# Patient Record
Sex: MALE | URBAN - METROPOLITAN AREA
[De-identification: names, ages, dates, MRNs, and addresses within clinical notes are randomized per-mention and may not be internally consistent; named-entity substitution may affect disease eponyms.]

---

## 2024-08-16 ENCOUNTER — DOCUMENTATION (OUTPATIENT)
Dept: TRANSPLANT | Facility: HOSPITAL | Age: 69
End: 2024-08-16
Payer: MEDICARE

## 2024-08-19 ENCOUNTER — DOCUMENTATION (OUTPATIENT)
Dept: TRANSPLANT | Facility: HOSPITAL | Age: 69
End: 2024-08-19
Payer: MEDICARE

## 2024-08-19 ENCOUNTER — TELEPHONE (OUTPATIENT)
Dept: RESPIRATORY THERAPY | Facility: HOSPITAL | Age: 69
End: 2024-08-19

## 2024-08-19 NOTE — Clinical Note
[facility] auth# [id number] valid 08/16/24-02/15/25.  Listing approval will be needed.

## 2024-08-19 NOTE — PROGRESS NOTES
[facility] auth# [id number] valid 08/16/24-02/15/25.  Listing approval will be needed.

## 2024-08-21 ENCOUNTER — TELEPHONE (OUTPATIENT)
Dept: TRANSPLANT | Facility: HOSPITAL | Age: 69
End: 2024-08-21

## 2024-08-21 NOTE — TELEPHONE ENCOUNTER
Lung Intake  Intake Facilitator: Debbie Juarez   Have you ever contacted the [facility] [place] before? No   Are you currently listed or being evaluated by any other transplant center? No   If yes, where are you currently listed or being evaluated for transplant? N/A  Are you also seeking other organ transplant(s)? No   Do you have your own transportation? Yes   Do you have a 's license? Yes   Do you have a working vehicle? Yes   Do you have someone in your support system that can drive you to and from all of your appointments as needed? Yes, DANYELLE KAMARA  Are you prepared to drive to [facility] in [place], [place] for all necessary appointments, if no, you may need to speak with a  prior to scheduling your appointments. We are unable to assist with transportation or parking. Yes   Who do you anticipate will be your main support person? Yes, DANYELLE KAMARA    Pulmonary Care  Have you been admitted to the hospital in the past twelve months? Yes   If yes, when and where were you admitted? 05/2024, [facility]  Are you currently prescribed oxygen? Yes   How much oxygen do you require at rest? 4 LITERS  How much oxygen with activity? 4 LITERS  Do you wear CPAP or BIPAP? No, HAD BIPAP   Are you currently participating in pulmonary rehab? Yes   If yes, where and how frequently do you participate? [facility], 3X A WEEK MON, WED, THURS  Have you had Pulmonary function testing (PFT) in the last six months? Yes   If yes, when and where? [facility], 07/2024  Have you had a CT scan of your chest in the last six months? Yes   If yes, when and where? [facility], 05/2024  Have you had a cardiac catheterization in the last six months? No   If yes, when and where? N/A  Have you had an echocardiogram in the last 6 months? No   If yes, when and where? N/A        General Health Status Lung  Any food or drug allergies? No   How tall are you?  6'0  What is your usual weight? 180  Have you lost  "weight in the past 3 months? Yes   If yes, how much weight have you lost? 15 LBS  If yes, was this weight loss intentional? Yes   Has your food intake or appetite decreased over the past week? No   Do you have any chronic infections or blood infections such as HIV, Hepatitis C, Hepatitis B? No   Have you had a shingles vaccine? No   Have you had an influenza vaccine in the last year? No   Have you had a pneumonia vaccine in the last 5 years? Yes, 11/2022  Have you had COVID? Yes   Have you received the COVID vaccine? Yes   Have you received any vaccines in the last 3 months? No   Have you had previous surgery in the chest area (heart and/or lungs)? Yes   When, where and what chest surgeries have you had? OPEN HEART SURGERY 04/2021.  Are you a Temple? No   Do you have any Jehovah's witness beliefs that prevent you from receiving blood products? No   Have you ever had blood transfusions? No   How many blood transfusions have you had?  N/A  When was your last blood transfusion? N/A  Have you ever been pregnant? No   How many pregnancies have you had (including any that did not come to term)  When was your most recent pregnancy? N/A  Do you use any assistive devices? (Walker, wheelchair, glasses, hearing aids, etc.) Yes   If yes, what assistive devices are used? READING GLASSES  Any difficulty reading or writing? No   Do you have any other medical conditions besides your lung disease? Yes, \"LITTLE HEART PROBLEM\"  Cancer History Lung  Do you have a history of cancer? No   Cancer details N/A  Oncologist Name and when was your last visit N/A  Substance Use History Lung  Are you a current or former tobacco user? Yes, FORMER  When did you start using tobacco products? (year) 1970  Describe your tobacco use. (packs/day, cigarettes/day) 2 PACKS A DAY  When did you quit using tobacco? 03/2022  Do you use other forms of nicotine such as patches, gum, vapes, or e-cigs? No   Describe your nicotine use including amount and " frequency? N/A  When did you quit using nicotine? N/A  Are you a current or former alcohol user? Yes, SOCIALLY    How many drinks per week? 2 PER YEAR  When did you quit drinking alcohol? N/A  Have you ever received treatment for alcohol abuse? NO  If so, where and when? N/A  Have you been prescribed medical marijuana? No   What is your prescription and who prescribes it? N/A  How do you consume your marijuana? N/A  Are you a current or former user of non-prescribed drugs such as marijuana, heroin, cocaine, meth, crack, opioids, or any other recreational substances? No   Describe your drug use. N/A  When did you quit using drugs? N/A  Have you ever received treatment for substance abuse? No   If so, where and when? N/A  General Health Maintenance  Have you had a colonoscopy in the last 5 years? No   If yes, when and where? N/A  If no, do you have a family history of colon cancer? No   Have you had a PAP test in the past 2 years? No   If you had a hysterectomy, is a PAP test no longer needed? No   When and where was your last PAP test performed? N/A  PAP test results, other comments N/A  Do you have a family history of breast cancer? Yes   When and where was your last mammogram? N/A  Have you visited a dentist within the last 6 months? No   Do you have full or partial dentures? Yes, FULL   When and where was your last dental visit? 20 YEARS AGO, IN [place]  What is your availability for appointments? We will try to accommodate when possible. OPEN AVAILABILITY  If still working, can you get time off? NOT WORKING  Do you consent to bringing a support person with you to Education and Evaluation appointments? Yes   Are you able to bring a medication list as well as an immunizations list to your education appointment? Yes    Comments: VERY KIND.

## 2024-08-28 ENCOUNTER — APPOINTMENT (OUTPATIENT)
Dept: TRANSPLANT | Facility: HOSPITAL | Age: 69
End: 2024-08-28
Payer: MEDICARE

## 2024-09-03 ENCOUNTER — SOCIAL WORK (OUTPATIENT)
Dept: TRANSPLANT | Facility: HOSPITAL | Age: 69
End: 2024-09-03
Payer: MEDICARE

## 2024-09-03 ENCOUNTER — OFFICE VISIT (OUTPATIENT)
Dept: TRANSPLANT | Facility: HOSPITAL | Age: 69
End: 2024-09-03
Payer: MEDICARE

## 2024-09-03 ENCOUNTER — NURSE ONLY (OUTPATIENT)
Dept: TRANSPLANT | Facility: HOSPITAL | Age: 69
End: 2024-09-03
Payer: MEDICARE

## 2024-09-03 VITALS
HEART RATE: 92 BPM | DIASTOLIC BLOOD PRESSURE: 88 MMHG | HEIGHT: 71 IN | OXYGEN SATURATION: 90 % | BODY MASS INDEX: 25.76 KG/M2 | TEMPERATURE: 97.5 F | SYSTOLIC BLOOD PRESSURE: 145 MMHG | WEIGHT: 184 LBS

## 2024-09-03 DIAGNOSIS — Z95.1 S/P CABG X 3: ICD-10-CM

## 2024-09-03 DIAGNOSIS — Z01.818 ENCOUNTER FOR PRE-TRANSPLANT EVALUATION FOR LUNG TRANSPLANT: Primary | ICD-10-CM

## 2024-09-03 DIAGNOSIS — F41.9 ANXIETY: ICD-10-CM

## 2024-09-03 DIAGNOSIS — J43.9 PULMONARY EMPHYSEMA, UNSPECIFIED EMPHYSEMA TYPE (MULTI): ICD-10-CM

## 2024-09-03 DIAGNOSIS — I50.9 CONGESTIVE HEART FAILURE, UNSPECIFIED HF CHRONICITY, UNSPECIFIED HEART FAILURE TYPE (MULTI): ICD-10-CM

## 2024-09-03 DIAGNOSIS — I27.20 PULMONARY HYPERTENSION (MULTI): ICD-10-CM

## 2024-09-03 PROBLEM — D72.10 EOSINOPHILIA: Status: ACTIVE | Noted: 2024-09-03

## 2024-09-03 PROBLEM — I48.0 PAROXYSMAL ATRIAL FIBRILLATION (MULTI): Status: ACTIVE | Noted: 2024-09-03

## 2024-09-03 PROBLEM — J42 CHRONIC BRONCHITIS (MULTI): Status: ACTIVE | Noted: 2024-09-03

## 2024-09-03 PROCEDURE — 99215 OFFICE O/P EST HI 40 MIN: CPT | Performed by: INTERNAL MEDICINE

## 2024-09-03 PROCEDURE — 3008F BODY MASS INDEX DOCD: CPT | Performed by: INTERNAL MEDICINE

## 2024-09-03 PROCEDURE — 1126F AMNT PAIN NOTED NONE PRSNT: CPT | Performed by: INTERNAL MEDICINE

## 2024-09-03 PROCEDURE — 1159F MED LIST DOCD IN RCRD: CPT | Performed by: INTERNAL MEDICINE

## 2024-09-03 PROCEDURE — 1160F RVW MEDS BY RX/DR IN RCRD: CPT | Performed by: INTERNAL MEDICINE

## 2024-09-03 PROCEDURE — 99205 OFFICE O/P NEW HI 60 MIN: CPT | Performed by: INTERNAL MEDICINE

## 2024-09-03 RX ORDER — ASPIRIN 81 MG/1
81 TABLET ORAL DAILY
COMMUNITY

## 2024-09-03 RX ORDER — TIOTROPIUM BROMIDE AND OLODATEROL 3.124; 2.736 UG/1; UG/1
2 SPRAY, METERED RESPIRATORY (INHALATION) DAILY
COMMUNITY

## 2024-09-03 RX ORDER — TRIAMCINOLONE ACETONIDE 55 UG/1
2 SPRAY, METERED NASAL NIGHTLY PRN
COMMUNITY

## 2024-09-03 RX ORDER — OXYCODONE HYDROCHLORIDE 5 MG/1
5 TABLET ORAL EVERY 4 HOURS PRN
COMMUNITY

## 2024-09-03 RX ORDER — AMOXICILLIN 250 MG
1 CAPSULE ORAL DAILY PRN
COMMUNITY

## 2024-09-03 RX ORDER — ATORVASTATIN CALCIUM 10 MG/1
10 TABLET, FILM COATED ORAL DAILY
COMMUNITY

## 2024-09-03 RX ORDER — GUAIFENESIN 600 MG/1
400 TABLET, EXTENDED RELEASE ORAL DAILY
COMMUNITY

## 2024-09-03 RX ORDER — PREDNISONE 10 MG/1
30 TABLET ORAL DAILY
COMMUNITY

## 2024-09-03 RX ORDER — FUROSEMIDE 20 MG/1
20 TABLET ORAL DAILY
COMMUNITY

## 2024-09-03 RX ORDER — MIRTAZAPINE 30 MG/1
30 TABLET, FILM COATED ORAL NIGHTLY
COMMUNITY

## 2024-09-03 RX ORDER — IPRATROPIUM BROMIDE AND ALBUTEROL SULFATE 2.5; .5 MG/3ML; MG/3ML
3 SOLUTION RESPIRATORY (INHALATION) 3 TIMES DAILY
COMMUNITY

## 2024-09-03 RX ORDER — ALBUTEROL SULFATE 90 UG/1
2 INHALANT RESPIRATORY (INHALATION) EVERY 6 HOURS PRN
COMMUNITY

## 2024-09-03 RX ORDER — CETIRIZINE HYDROCHLORIDE 5 MG/1
10 TABLET ORAL DAILY
COMMUNITY

## 2024-09-03 ASSESSMENT — ENCOUNTER SYMPTOMS
SLEEP DISTURBANCE: 1
HEMATOLOGIC/LYMPHATIC NEGATIVE: 1
GASTROINTESTINAL NEGATIVE: 1
ENDOCRINE NEGATIVE: 1
NUMBNESS: 1
ACTIVITY CHANGE: 1
NERVOUS/ANXIOUS: 1
SHORTNESS OF BREATH: 1
MUSCULOSKELETAL NEGATIVE: 1
FATIGUE: 1
LIGHT-HEADEDNESS: 1
COUGH: 1
DIZZINESS: 1

## 2024-09-03 ASSESSMENT — ANXIETY QUESTIONNAIRES
5. BEING SO RESTLESS THAT IT IS HARD TO SIT STILL: NOT AT ALL
3. WORRYING TOO MUCH ABOUT DIFFERENT THINGS: SEVERAL DAYS
GAD7 TOTAL SCORE: 4
7. FEELING AFRAID AS IF SOMETHING AWFUL MIGHT HAPPEN: NOT AT ALL
4. TROUBLE RELAXING: NOT AT ALL
6. BECOMING EASILY ANNOYED OR IRRITABLE: SEVERAL DAYS
2. NOT BEING ABLE TO STOP OR CONTROL WORRYING: SEVERAL DAYS
1. FEELING NERVOUS, ANXIOUS, OR ON EDGE: SEVERAL DAYS

## 2024-09-03 ASSESSMENT — PATIENT HEALTH QUESTIONNAIRE - PHQ9
8. MOVING OR SPEAKING SO SLOWLY THAT OTHER PEOPLE COULD HAVE NOTICED. OR THE OPPOSITE, BEING SO FIGETY OR RESTLESS THAT YOU HAVE BEEN MOVING AROUND A LOT MORE THAN USUAL: NOT AT ALL
5. POOR APPETITE OR OVEREATING: NOT AT ALL
6. FEELING BAD ABOUT YOURSELF - OR THAT YOU ARE A FAILURE OR HAVE LET YOURSELF OR YOUR FAMILY DOWN: NOT AT ALL
4. FEELING TIRED OR HAVING LITTLE ENERGY: NOT AT ALL
SUM OF ALL RESPONSES TO PHQ QUESTIONS 1-9: 1
SUM OF ALL RESPONSES TO PHQ9 QUESTIONS 1 & 2: 0
1. LITTLE INTEREST OR PLEASURE IN DOING THINGS: NOT AT ALL
2. FEELING DOWN, DEPRESSED OR HOPELESS: NOT AT ALL
7. TROUBLE CONCENTRATING ON THINGS, SUCH AS READING THE NEWSPAPER OR WATCHING TELEVISION: NOT AT ALL
3. TROUBLE FALLING OR STAYING ASLEEP OR SLEEPING TOO MUCH: SEVERAL DAYS
9. THOUGHTS THAT YOU WOULD BE BETTER OFF DEAD, OR OF HURTING YOURSELF: NOT AT ALL

## 2024-09-03 ASSESSMENT — PAIN SCALES - GENERAL: PAINLEVEL: 0-NO PAIN

## 2024-09-03 NOTE — PROGRESS NOTES
PRE-TRANSPLANT EDUCATION    Patient received education regarding the following topics as part of their pre-transplant evaluation:  The evaluation process, including:        o   Transplant team members and roles         o   Required consultations and testing        o   Selection criteria and suitability for transplant        o   Listing process and receiving an organ offer        o   Psychosocial and financial considerations for a successful transplant        o   Patient responsibilities, including the necessity of adhering to a strict medical regimen  An overview of the surgical procedure   Potential medical, surgical, and psychosocial risks to transplantation, including:        o   Wound infection        o   Pneumonia        o   Blood clot formation        o   Organ rejection, failure, and possibility of re-transplantation        o   Lifetime immunosuppression therapy and associated risks        o   Multi-organ system failure        o   Death        o   Depression        o   Post-Traumatic Stress Disorder        o   Generalized anxiety, issues of dependence, and feelings of guilt  Available alternatives to transplantation  National and [facility] [place] outcomes for one-year patient and graft-survival from the most recent SRTR program-specific report.     Donor risk factors that could affect the success of the transplant and the health of the patient, including:        o   Donor age        o   Donor medical and social history        o   Condition of the organ        o   Risk of inocencia cancer, HIV, Hepatitis B, Hepatitis C, or malaria if the infection is not detectable at the time of donation  Patient's right to withdraw consent for transplantation at any time during the process  Transplants not performed in a Medicare-approved transplant center could affect the patient's ability to have immunosuppression medication paid for under Medicare part B.     Patient was given the opportunity to have questions  answered.    Signed evaluation informed consent received? Yes.     Patient accompanied by Eunice godwin. Patient's goal with transplant is to be able to walk more freely and spend time with his horses. 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 Both participants very engaged and enthusiastic about the transplant evaluation process. Asked appropriate questions. He reports that he is going to pulmonary rehab 3 times/week. Reviewed medications with patient. He is taking oxycodone PRN per Palliative care for anxiety r/t SOB. Discussed that this would likely need to be stopped in order to continue on with the evaluation process. Patient also wondering when would be a good time to travel to visit his sons in [place]. For oxygen, he is wearing 4L NC with activity/rest/sleep. He states that he is in the process of obtaining a CPAP. Discussed patient with Amy BARROS and INGA White.

## 2024-09-03 NOTE — PROGRESS NOTES
Pre-transplant Evaluation    Jorge Alberto Martin is a yo M former smoker (78.6 pack/year) quit on 05/2023 who presents for initial lung transplant evaluation as a complication of Pulmonary HTN, he follows up with Dr. 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 Hx of PE (12/2022), Hx of HF with Recovered EF (35% 2022-> 65% 2024), PAF on Eliquis and Amiodarone previously, chronic peripheral eosinophilia and Anxiety.    He did encounter an episode of pneumonia and underwent Bronchoscopy 05/2024 which was positive for C albicans and M gordonae after that he was evaluated by [facility], PFT was obtained showing decreased in DLCO in the last year down to 23%. FEV1 47 to 50% remains stable. 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 He is currently on Stiolto inhaler, Duoneb Nebulizer and Prednisone 30 mg daily (which was initially prescribed as a Taper not clear why he is still using the full dose),, he is being followed by Palliative Medicaine and receiving Oxycodone and Mirtazapine for Anxiety and Insomnia. He is also  participating in pulmonary Rehab three times per week ( Mon, Wed, Fri).     Past Medical History:  Past Medical History:   Diagnosis Date    CAD (coronary artery disease)     Chylothorax     COPD (chronic obstructive pulmonary disease) (Multi)     Hx of atrial flutter     Mitral regurgitation     Pneumonia         Past Surgical history:  Past Surgical History:   Procedure Laterality Date    CARDIAC CATHETERIZATION      CORONARY ARTERY BYPASS GRAFT      EYE SURGERY      MITRAL VALVE REPAIR          Current Medications:   Current Outpatient Medications on File Prior to Visit   Medication Sig Dispense Refill    albuterol 90 mcg/actuation inhaler Inhale 2 puffs every 6 hours if needed for wheezing or shortness of breath.      aspirin 81 mg EC tablet Take 1 tablet (81 mg) by mouth once daily.      atorvastatin (Lipitor) 10 mg tablet Take 1 tablet (10 mg) by mouth once daily. Unsure of dose      cetirizine (ZyrTEC) 5 mg tablet Take 2 tablets (10 mg) by mouth once daily.      furosemide (Lasix) 20 mg tablet Take 1 tablet (20 mg) by mouth once daily.      guaiFENesin (Mucinex) 600 mg 12 hr tablet Take 400 mg by mouth once daily. Do not crush, chew, or split.      ipratropium-albuteroL (Duo-Neb) 0.5-2.5 mg/3 mL nebulizer solution Take 3 mL by nebulization 3 times a day.      mirtazapine (Remeron) 30 mg tablet Take 1 tablet (30 mg) by mouth once daily at bedtime.      oxyCODONE (Roxicodone) 5 mg immediate release tablet Take 1 tablet (5 mg) by mouth every 4 hours if needed for severe pain (7 - 10). Max of 20mg daily      predniSONE (Deltasone) 10 mg tablet Take 3 tablets (30 mg) by mouth once daily.      sennosides-docusate sodium (Mare-Colace) 8.6-50 mg tablet Take 1 tablet by mouth once daily as needed for constipation.      tiotropium-olodateroL (Stiolto Respimat) 2.5-2.5 mcg/actuation mist inhaler Inhale 2 Inhalations once daily.      triamcinolone (Nasacort) 55 mcg nasal inhaler Administer 2 sprays into each nostril as  "needed at bedtime for rhinitis.       No current facility-administered medications on file prior to visit.       Allergies: No Known allergies.     Social History:    Retired  beatris,Originally from [place],   in a process of divorce, had a Fiancee who has been together for 10 years and she is willing to be his first support person and willing to move in after his operation, he lives in a second floor of 2 stories  home with his brother and his wife, he is close to his brother and his brother is his second person of support. He has 2 Sons and one grand son and they all live in [place].     Tobacco Use:   Smoking status: Former     Current packs/day: 0.00     Average packs/day: 1.5 packs/day for 52.4 years (78.6 ttl pk-yrs)     Types: Cigarettes     Start date:      Quit date: 2023     Years since quittin.2    Smokeless tobacco: Never    Substance Use: Never    Alcohol use:  one drink per month only when he visits his sons.     Vaccination: Up to date with vaccination.     Family History:   Father: Lung Cancer.  Mother: Lung Cancer.      Review of Systems   Constitutional:  Positive for activity change and fatigue.   Respiratory:  Positive for cough and shortness of breath.    Gastrointestinal: Negative.    Endocrine: Negative.    Genitourinary: Negative.    Musculoskeletal: Negative.    Skin: Negative.    Neurological:  Positive for dizziness, light-headedness and numbness.   Hematological: Negative.    Psychiatric/Behavioral:  Positive for sleep disturbance. The patient is nervous/anxious.          9/3/2024    10:50 AM   Vitals   Systolic 145   Diastolic 88   Heart Rate 92   Temp 36.4 °C (97.5 °F)   Height (in) 1.791 m (5' 10.5\")   Weight (lb) 184   BMI 26.03 kg/m2   BSA (m2) 2.04 m2   Visit Report Report        Physical Exam  Vitals reviewed.   Constitutional:       Appearance: Normal appearance. He is normal weight.   HENT:      Head: Normocephalic and atraumatic.      Mouth/Throat: "      Mouth: Mucous membranes are dry.   Eyes:      Extraocular Movements: Extraocular movements intact.      Conjunctiva/sclera: Conjunctivae normal.      Pupils: Pupils are equal, round, and reactive to light.   Cardiovascular:      Rate and Rhythm: Normal rate and regular rhythm.      Pulses: Normal pulses.      Heart sounds: Murmur heard.   Pulmonary:      Effort: Pulmonary effort is normal.      Breath sounds: Rhonchi and rales present.   Abdominal:      General: Abdomen is flat. Bowel sounds are normal.      Palpations: Abdomen is soft.   Musculoskeletal:      Cervical back: Normal range of motion and neck supple.   Skin:     General: Skin is warm and dry.   Neurological:      Mental Status: He is alert and oriented to person, place, and time.           Data:  PFT:         6MWT:      CXR (07/16/2024):Postsurgical changes again project over the heart. Cardiac and mediastinal silhouette within normal limits. Lungs again show patchy and partially confluent, interstitial and airspace opacities projecting over the lower lungs, mildly increased on the right and similar on the left. Mild pleuroparenchymal thickening or scarring in the lung bases, similar. No apparent pneumothorax.     CT Chest Angio (05/16/2024):  Lungs: There is extensive emphysema of the lungs. There are new small airspace consolidations noted posteriorly in the bilateral upper lobes. Airspace consolidations noted in the bilateral lower lobes dependently are increased compared to previous exam..  No parenchymal or pleural-based mass.  Pleural fluid: None.  Exam quality: Good contrast enhancement of the vasculature.   Pulmonary Arteries: No filling defects identified throughout the main pulmonary arteries along with the lobar, segmental and visualized subsegmental branches.   Aorta: No evidence for aortic aneurysm is identified.   Heart: No abnormality .    Coronary Arteries:  Prominent coronary artery calcification.   Mediastinum/Belkis: No  mediastinal or hilar mass.    Upper abdomen: Unremarkable.   Osseous structures: No abnormality.       TTE (01/29/2024):  Left Ventricle Left ventricle size is normal. Normal wall thickness. Normal left ventricular systolic function. TheEF by visual approximation is 55%. Normal wall motion. Right Ventricle Right ventricle size is normal. Normal systolic function. Left Atrium Left atrium size is normal. Interatrial Septum No interatrial shunt visualized on color Doppler. Right Atrium Right atrium size is normal. Aortic Valve Trileaflet. No cusp thickening. No cusp calcification. No regurgitation. No stenosis. Mitral Valve #35 Annuloplasty ring repaired valve. Trace regurgitation. Mild stenosis noted. MVmeangradient is 7 mmHg. Tricuspid Valve Valve structure is normal. Moderate (2+) regurgitation. RVSP is 60 mmHg. Pulmonic Valve Valve structure is normal. Trace regurgitation. Aorta Normal sized sinuses of Valsalva and ascending aorta. IVC/Hepatic Veins IVC diameter is normal and decreases greater than 50% during inspiration; thereforetheestimated right atrial pressure is normal (~3 mmHg). Pericardium No pericardial effusion. Study Details Image quality: adequate. Blood pressure: 100/68 mmHg. No contrast     RHC/LHC( 05/1/2023):  LHC :  Dominance : right  LM - Severe ostial LM disease, 80%  LAD - Tandem lesions in mid LAD, 60-70% . Severe disease in ostial Diag 1  LCX - mid LCX / OM1 with 95% disease  RCA - 60% disease in ostial RCA and 60-70% in distal RCA     LVEDp - 24 mm Hg  Aortic valve - No stenosis on pull back     RHC :  Mean RA 10  RV 50/5/11  PA 55/30/42  Mean PCWP 28  CO/CI - 3.9 /2.8 ( [place]) 4.2 / 2.9 ( Thermo)  AO - 106/50/78  PVR - 3.5     Conclusion :  LHC - Severe multivessel disease including Left Main  RHC - Elevated right and left sided pressures. Moderate Pulmonary HTN, pre and post. Mildly decreased cardiac output.        Labwork 07/16/2024:  CBC with differential: WBCs 5, hemoglobin 13.3,  hematocrit 40, MCV 86.6, platelets 183, basophils relative 0.6%, neutrophils relative 56%, lymphocytes relative 19.4%, monocytes relative 11.7%, eosinophils relative 12.1%,absolute neutrophils 2.8, lymphocyte absolute 1.0, monocyte absolute 0.6, eosinophils absolute 0.6, basophils absolute 0.0.    BMP: Sodium 136, potassium 3.9, chloride 105, carbon dioxide 22, anion gap 9, urea nitrogen 11, creatinine 0.76, calcium 8.6, glucose 100, eGFR more than 90    Labs on 05/22/2024:  VBG: pH 7.42, pCO2 30.3, HCO3 20.1  Respiratory culture:  Few Candida albicans  AFB stain: No acid-fast bacilli  BAL right lower lobe: ATYPICAL SQUAMOUS CELLS, FAVOR REACTIVE CELL CHANGES.MICROORGANISMS CONSISTENT WITH [place] SPP. ARE IDENTIFIED. Adequate specimen containing > 90% alveolar macrophages.  Pneumonia PCR: Positive for human rhinovirus/enterovirus    Hemoglobin A1c 05/23/2023: 5.8  04/30/2023: COVID-positive    Assessment/Plan  Jorge Alberto Martin is a 69 yo M who presents for initial evaluation for Double lung transplant for pulmonary HTN and COPD and given the severity of his disease, lung transplant evaluation is warranted at this time. His/Her BENTON score is 6 with a 5 year mortality of 57%.     We discussed at length the risks and benefits of lung transplant. We discussed the one year survival of 90%, 5 year survival of 50%. We discussed the need to absolutely take anti-rejection medications for the rest of his/her life to prevent graft failure. We also discussed the necessity of frequent follow-up post transplant at  (particularly at [facility]) with bi-weekly clinic visits, bi-weekly labwork and bronchoscopies every 2 months for the first 2 years post-transplant.    Meanwhile we recommend the following:   - Wean off prednisone 30mg daily, will advice for maximum dose of 10mf daily prior to transplant.   - Stop using Oxycodone as management for Anxiety and will recommend alternative approach or evaluation by Psychiatric.   - Will  limit analgesics use to Paracetamol prior to transplant.   - BMI is acceptable will advice to maintain the current wait.   - Obtain the Following vaccination: Pneumococcus, COVID, RSV, Influenza, Shingles and Hepatitis B.   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     Patient to be discussed tomorrow at the pulmonary transplant selection meeting. Patient and Dr Mathews to be informed about the meeting results after.     Patient and his Fiancee voiced understanding of plan of care, total time spent 60 minutes regarding counseling and coordination of care.  We will proceed with the full evaluation ideally in the next 1-2 weeks.

## 2024-09-03 NOTE — PROGRESS NOTES
"ENCOUNTER    Visit Type Initial Visit  Location: [facility]     Barriers to Communication / Understanding:   [] Language [] Vision [] Hearing [] Other      []  Present     Accompanied By: Eunice \"better half\" engaged     Organ For Transplant: Lung    Device For Implant: n/a    Ethnicity:  White    ADLs Fully Independent    Instrumental ADLs Fully Independent takes time to do things, still driving    Level of Activity Active    DME oxygen 4 at all times, might shut off when driving, shaking vest that he uses     Knowledge of Health Good  Anxiety, on a little bit of meds palliative care prescribes, dr james?    Why Do You Have End Stage Organ Disease smoking, environmental, lived in [facility]     Knowledge of Transplant / VAD:  Yes Patient Is Able To Make An Informed Decision    Patient Understands the Risks of Transplant / VAD  Yes Rejection  Yes Infection Yes Complications  Yes Death  Rejection, infections     Patient Understands Recovery and Follow-Up from Transplant / VAD  Yes Length Of Stay Yes Appointments  Yes Labs  Yes Rehabilitation  Pulmonary rehab 2x     Patient Has Identified Goals of Transplant / VAD Yes  Wants to get better, knows if he didn't he might make it three years, knows he is not going to get better but get worse,   Granddtr in [place], almost 2 years    Any Potential Donors? N/a    Overall Compliance  good 4-5 plus OTC equaling around 14 , including inhalers, machine at least 3 times a day     Compliance With Medications  good    Managed By Patient morning and evening schedule, bottles     Understanding Of Medication  good  Compliance With Appointments Good uses My Chart     How Does Patient Handle Health Problems:  takes something, figure out what to take for it, would call dr office and make an appt if continues to get worse    Organ Lung    Lung    Pulmonary Rehab: Active     IOP:      O2 Therapy Compliance     Medical And Clinic Appointment Compliant     SOCIAL " HISTORY  Yes   [employer]  no injuries     Education:  education: HS Diploma    Literate Yes   Computer literate Yes  Internet access Yes   Not on computers but does use my chart and email     Sources of Income:  Patient's Current Employment    [] Full-Time [] Part-Time [] Unemployed [x] Retired  Electrical coordinator, 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67, 2 years ago      []  None [] Not working by choice [] Not working disabled     [] Short Term Leave   [] Other   Employment History electrical coordinator, owned a coffee shop in [place]   Moved to [place] 10 years ago     Will patient have paid status from employment during recovery No  None    Spouse / SO Current Employment Part Time [employer]      Will spouse / SO have paid status from employment during recovery No  None make things work with family and friends     Other Sources of Income SSI  Hard time paying     Does patient have financial concerns No maintance on car      Is patient able to meet current monthly expenses Yes can be challenging to add     Resources: none      Patient was provided information on transplant fundraising Yes    Insurance:  Primary Insurance Self and Medicare [employer] Medicare    Secondary Insurance     Prescription Coverage     Medicare coverage due to     Medicare Part A Yes Effective date    Medicare Part B Yes Effective date    Medicare Part C No Deductable  Out of Pocket Max    Medicare Part D No Extra Help?    Medicaid No Waiver No Redetermination Date    HMO       FAMILY SYSTEM  Single No   Yes How long   Describe Relationship   Yes How long   Describe Relationship   Yes When Currently legally , [place]  No When  In a Relationship Yes  How Long 10 years Describe Relationship fantastic     Spouse / SO Name Eunice Hahn  Age 47  Health  Fine, anxiety  Other Caregiver Responsibilities helps with mom, who has krohns deisease   Spouse / Significant others reaction to donation    Children:  Yes # Biological 2  boys  No # Adopted   No # Step Children    Child #1 Name Shay Martin  Age 36  Health good   Lives Out Of Town [place]   How Much Contact Weekly    Child #2 Name Long Martin  Age 24  Health okay, anxiety issues   Lives Out Of [facility]  How Much Contact Monthly not much contact, he typically doesn't respond    Parents:  Raised By One Biological Parent  Did the patient have contact with the other parent No    Mother  Yes Age 82  Cause of Death Body deteriorated, lung and breast cancer, smoker and was a    Father   Age   Cause of Death    Siblings:  [x] # Biological 1 brother and 1 sister [] # Half Siblings [] # Step Siblings     Sibling #1 Eric lives with   Sibling #2 Adilia in [place]    Support & Recovery Plan:  Yes Adequate    Primary Support:  Name Eunice Meladrew Phone [phone]  Age 49  Relationship to Patient SO  If employed, can they take time off work Yes works as a   If so, is it paid time off No   If not, will this impact your finances Yes   Did they attend education classes Yes   Do they have other caregiver responsibilities (child or eldercare) No   Do they have their own conditions which may prevent them from providing care for you No  (Medical, psychological, physical limitations)  Are they available on short notice Yes   Are they reliable Yes   Are they responsible Yes   Are they able to understand and process new information Yes   Do they have reliable transportation or will you allow them to use your vehicle Yes   Are they currently involved in your care Yes   Comments    Secondary Support:  Name Eric Martin Phone [phone]  Age 75  Relationship to Patient brother  If employed, can they take time off work No retired, industrial , 61 and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 [age] years old when retired  If so, is it paid time off No   If not, will this impact your finances No   Did they attend education classes No wife has read through it   Do they have other  caregiver responsibilities (child or eldercare) No backup for 3 grandkids   Do they have their own conditions which may prevent them from providing care for you No  (Medical, psychological, physical limitations)afib, for 15 years, cpap, goes to y 5 days a week, swim and work outs   Are they available on short notice Yes   Are they reliable Yes   Are they responsible Yes   Are they able to understand and process new information Yes   Do they have reliable transportation or will you allow them to use your vehicle Yes   Are they currently involved in your care Yes   Comments hip replacement     Alternate Support:  Name Deja Martin Phone [phone] Age 76  Relationship to Patient sister in law  If employed, can they take time off work No retired,  for brothers company  If so, is it paid time off No   If not, will this impact your finances No   Did they attend education classes No reviewed at home   Do they have other caregiver responsibilities (child or eldercare) No is a back up for grandkids  Do they have their own conditions which may prevent them from providing care for you No  (Medical, psychological, physical limitations)   Are they available on short notice Yes   Are they reliable Yes   Are they responsible Yes   Are they able to understand and process new information Yes, does the taxes for the house  Do they have reliable transportation or will you allow them to use your vehicle Yes   Are they currently involved in your care Yes   Comments    Alternate Support    Housing:  Yes Adequate Lives in someone else's home  Type of Home House two story, chair lift, will be downstairs, 2 steps to get into the house  Distance to [facility] 47 minutes  Pets none  Does Patient Feel Safe in Home Yes       Transportation:  Yes Adequate  # Licensed Drivers in the Home 3  Does Patient Drive Yes If not, why  # Reliable Vehicles 3  Does Patient use Public Transportation No  Does Patient use Medical Transportation  No  Comments    MENTAL HEALTH  The patient reports their mood as soso, tired of being sick, wants to do things he use to do, went to nephews bday party, hit balls at driving range but not as much as he use to.      Reported Mental Health Diagnosis anxiety  Anxiety   Family History of Mental Health Concerns son has anxiety  What are patient psychosocial stressors breathing, how long going to make it, going through this process    Cognition:  No impairment observed / reported     Current Medications:    Yes  Mental Health Meds  oxycodone Rx'd by Specialist palliative given three months ago, was recently increased  Sleep Meds mirtazapine  Rx'd by PCP  Pain Meds none  Rx'd by     OTC Meds mucinex, zyrtec, baby aspirin, doulcalx, nasacort for allergies,   Past Medications none    Counseling Never: going through palliative care      IOP  Has patient ever been hospitalized for mental health reasons No     Referral to Transplant Psych Yes  Mental Health Follow Up Required Yes     Suicide Assessment:  History of Suicide Ideation No      History of Suicide Attempt No     History of Suicidal Ideation in the past 3 months No     Patient's Reported Trauma History none    What are patient's coping behaviors golf, fish when he can, go out shopping around, road trips, animals, horses, peacocks, goats and cats, dogs at Eunice's house    Uatsdin / Spirituality Advent not super Quaker anymore     Attitude toward interviewer Cooperative, Appropriate, and Guarded    Eye Contact Patient maintained good eye contact throughout appointment    Appearance The patient was neatly groomed, appropriately dressed and adequately nourished    Affect Appropriate and Anxious    Thought Process Appropriate and Flight of ideas    Substance Use /Abuse History:  Current Tobacco User No  Patient uses   Tobacco Frequency   For How Long  Is Patient Required to Quit   Former Tobacco User Yes  Describe past tobacco use and date quit  Pack and a half to 2  ppd, quit 2 years and 3 weeks ago, cold turkey and no medication, decided he needed to quit and he did     Current Alcohol User No  Type of Alcohol Used   Amount  Frequency   Pattern of Alcohol Use  Is Patient Required to Quit   Continued to use the substance despite being told the substance is affecting their health  History of problems at work, school or home due to substance use  Former Alcohol User Yes  Describe past alcohol use and date quit  Long time ago, drinks only in [place] 12 beers a year, nov last year was last drink   Has patient ever gone to CD treatment No  If yes, When, Where and What type of Program  Attends AA meetings Never   Sponsor  Do support people drink alcohol Yes  brother rarely bud NA  If yes, describe support people's use  Is alcohol kept in the home No  Does Patient need to sign a CD contract No    Current Illegal / Unprescribed Drug User No  Type of Illegal Drug Used   Frequency  Pattern of Drug Use  Is Patient Required to Quit   Continue to use the substance despite being told the substance is affecting their health  History of problems at work, school or home due to substance use  Former Illegal / Unprescribed Drug User Yes  Describe past illegal drug use and date quit  Teens, montez did coke every so often at that age, trent, friend  in his arms and he never touched it again   Has patient ever gone to CD treatment No  If yes, When, Where and What type of Program   Attends AA/NA  meetings    Is patient on a Methadone / Suboxone regiment No  Do support people use illegal drugs No  If yes, describe support people's use  Are illegal drugs kept in the home No  Does Patient need to sign a CD contract No    So sober 25 years   Prescription Drug Abuse:  No Has patient experienced feelings of addiction  No Has patient experienced symptoms of withdrawal  No Has patient experienced any side effects? e.g.  hallucinations or delusions    Does Patient Meet the Criteria for Alcohol Use  Disorder No Diagnosis  Does Patient Meet OSOTC guidelines Yes  Does Patient Meet the Criteria for Illegal Drug Use Disorder No Diagnosis  Does Patient Meet OSOTC guidelines Yes      LEGAL ISSUES  No Arrests  No Currently probation or parole    residential No  When   How long   Where       Citizenship:  Yes US Citizen  No Green Card No Visa    Advance Directives: No  HCPOA       Guardian:    DELMA XIONG met with pt and pt SO in [place] [address] for an initial evaluation for potential lung transplant. Pt and pt SO were active and engaged during visit. Pt stated he is fully independent at home, just takes longer to complete tasks, is still driving. Pt shared he has been active at home, not as active as he is used to being. Pt shared he uses 4 liters of oxygen at all times, sometimes he might shut off when driving. During visit, pt ran out of oxygen on the tank but didn't notice for some time. Pt shared he also uses a “shaking vest”. Pt shared he has been in good health otherwise, experiencing some anxiety in which he is prescribed some medications for it. Pt believed that his lung disease was caused from his smoking hx and potentially environmental. Pt demonstrated good understanding of tx process and expectations for follow up. Pt shared that his goal for advanced therapy is to get better, he knows that he might have three years without advanced therapies, has a two year old granddtr. Pt shared he is prescribed about 4-5 medications plus inhalers and takes several medications OTC, totaling close to 14 medications. Pt shared he does well with his medications, manages them on his own, has his morning and evening pill bottles lined up. Pt shared that he is currently active in pulmonary rehab 2x a week. Pt confirmed that he served in the [employer], no injuries. Pt confirmed he earned his high school diploma and uses the  My Chart on his phone along with email. 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 Pt shared he previously owned a coffee shop in [place], had moved back to [place] about 10 years ago. Pt so shared she works as a [employer] , she does not have access to pto or FMLA as it is a part time position. Pt shared his source of income includes SSI. Pt shared he does not have financial concerns but does need to pay for upkeep on his car often. Pt shared he is able to meet monthly expenses but knows that adding any new expenses will be challenging to manage. Pt stated he has a [employer] Medicare insurance. Pt shared that he is currently in a relationship with Eunice Hahn, have been together for 10 years. Pt disclosed that he is still legally  but has been  for many years. Pt does not have contact with his legal wife. SW reviewed advanced directives with pt and shared it would be important for pt to complete these forms, especially for MPOA. Pt shared he has two adult children that live in [place]. Pt shared he was raised by his mom who has passed away and he had no contact with father. Pt confirmed he has one brother that he lives with and one sister that lives in [place]. Pt named his SO as his primary support person and then his brother and sister in law as his additional support people. SW called and spoke to pt brother who confirmed pt is living with him and wife Deja. Pt brother Eric and Deja are both retired and in their mid 70s. Eric confirmed they are active, he [facility] his local [facility] 5 days a week to work out and swim. He confirmed they are in good health overall and they have drivers licenses and access to vehicles. Pt stated he is living with his brother and sister in law in a two story house that has a chair lift to get to the second floor. Pt stated he usually stays downstairs and there are two steps to get into the house. Pt stated he lives about 47 minutes away from [facility] and he feels safe living here. Pt confirmed there are  three licensed drivers in the home with three reliable vehicles. Pt does not use public or medical transportation. Pt shared his mood has been so/so. Pt stated he is tired of being sick, is wanting to do things he use to do. Pt shared he was previously diagnosed with anxiety. Pt shared one of his sons was also diagnosed with anxiety. Pt shared some stressors for him at this time are his breathing, thinking about how long he has to live, and going through this process. SW provided encouragement and support to pt. Pt denied any cognitive concerns. Pt stated he is involved with a palliative care team and they prescribe him oxycodone to help with anxiety symptoms and is prescribed mirtazapine to help him sleep from his pcp. Pt shared he does take some OTC medications such as mucinex, Zyrtec, baby aspirin, doulcalx, and Nasacort for allergies. Pt stated he has never officially gone thru counseling services but is attending some counseling services through palliative care. Pt denied any hx of hospitalizations for mental health a concerns, hx of SI or suicide attempts. 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 Pt identified as Temple but stated he is not super Yazidism anymore. Pt denied any current tobacco use. Pt stated he used cigarettes, had quit about 2 years ago. Pt stated he worked up to a pack and half to two packs a day. Pt denied any current alcohol use. Pt stated he previously drank alcohol a long time ago. Pt stated would only drink when he lived in [place], would maybe have 12 beers a year, November of last year was his last alcoholic beverage. Pt denied any current illicit substance use. Pt shared that he used marijuana in his teen years, would use coke every so often at that age as well, along with mescaline which is a psychedelic substance similar to LSD. Pt shared he had a friend pass  away in his arms from an overdose and he never touched the substance again. Pt SO stated she is currently 25 years sober. Pt denied any feelings of addiction, withdrawal or side effects from any medication prescribed by a dr. Pt denied any hx of legal concerns. Pt stated he is a [place] citizen. Pt does not have any current advanced directives completed. SW confirmed pt has access to the documents needed to complete to get this paperwork in order. SW confirmed with pt and pt SO that they had no additional questions or concerns at this time. SW provided contact information.    PLAN  SW to continue following with pt and pt family as evaluation continues. SW to present pt to committee when appropriate. SIPAT core is 25 meaning minimally acceptable candidate and is a low psychosocial risk at this time.     Cindy TURNER

## 2024-09-03 NOTE — PROGRESS NOTES
"Subjective     Jorge Alberto Martin is a 68 y.o. year old male, with a diagnosis of Pulmonary hypertension. Initially referred by Dr. Surjit Villalpando, of [facility],       HPI:    No past medical history on file.  No past surgical history on file.  No current outpatient medications on file.  Not on File             Review of Systems    PMH  ***   Onset ***  Symptoms ***   Precipitating/Palliating ***  Current Treatment ***  Managed by ***    PREVENTATIVE CARE:  Vaccines:   Flu: ***   Covid: ***   Pneumonia: ***   Shingrix: ***   Hepatitis B: ***  Colonoscopy: ***  Mammogram: ***  PAP: ***  PSA: ***  Dental: ***    SOCIAL:  Marital Status: ***  Children: ***  Occupation: *** Retire Date: ***  Education: ***  Tobacco: ***  Alcohol: ***  Substance Use: ***    Primary Support Person: ***  Secondary Support Person: ***    Data:  PFTs (24):   6MWT (Date):  CXR (Date):  CT Chest/Abdomen/Pelvis (Date):  V/Q Scan (Date):  Barium Swallow(Date):  Gastric Emptying Study (Date):  TTE (Date):  RHC/LHC( Date):      No results found for: \"EBVDNAPCR\", \"CMVDNAPCR\", \"IGG\", \"TACROLIMUS\", \"SIROLIMUS\", \"WBC\", \"RBC\", \"HGB\", \"HCT\", \"PLT\", \"NEUTROABS\", \"GLUCOSE\", \"NA\", \"K\", \"CL\", \"ANIONGAP\", \"BUN\", \"CREATININE\", \"EGFR\", \"CALCIUM\", \"ALBUMIN\", \"ALKPHOS\", \"ALT\", \"AST\", \"PROT\", \"BILIDIR\", \"MG\"    Objective     Physical Exam    Assessment/Plan     Jorge Alberto Martin is {MISC; TRANSPLANT CANDIDATE RANKIN}         Assessment/Plan  Patient is a yo M/F who presents for initial evaluation for lung transplant and given the severity of his/her disease, lung transplant evaluation is warranted at this time. His/Her BENTON score is __ with a 5 year mortality of %.  We discussed at length the risks and benefits of lung transplant. We discussed the one year survival of 90%, 5 year survival of 50%. We discussed the need to absolutely take anti-rejection medications for the rest of his/her life to prevent graft failure. We also discussed the " necessity of frequent follow-up post transplant at  (particularly at [facility] with bi-weekly clinic visits, bi-weekly labwork and bronchoscopies every 2 months for the first 2 years post-transplant.  Patient and SO voiced understanding of plan of care, total time spent 60 minutes regarding counseling and coordination of care.  We will proceed with the full evaluation ideally in the next 1-2 weeks.      BARRIERS:  ***    We discussed the following at length:  - The lung transplant evaluation process including all testing   - The lung transplant surgery, various surgical approaches  - The hospital admission after lung transplant  - The UNOS waiting list  - The possibility that the evaluation may reveal something prohibitive and we can no longer proceed with transplant  - The possibility of a dry run if listed  - The survival rate of 1 year 90% and 5 year 50%  - The need for immunosuppression for the rest of their life  - The need for infection prophylaxis for the rest of their life  - The need to be managed by the Lung Transplant team for the rest of their life              Appointments twice per week at [facility] then gradually decrease              Bronchoscopies every 2 months for the first year post transplant              Bronchoscopies every 3 months for the second year post transplant              Frequent lab work, spirometry, and other testing as needed  - The possibility of allograft rejection including chronic rejection  - The possibility of infections secondary to immunosuppression  - The complications and precautions of being in an immunocompromised state  - The possibility of death    Advantages and disadvantages of transplantation were reviewed. Discussed the operative procedure and potential complications, particularly in regard to the need for re-operation. I addressed issues of post-operative recovery and follow-up. Lastly, I discussed the need for immunosuppressive therapy and the risk associated  with this treatment.    He appears to have {rankin} understanding of the medical condition and the transplant process.

## 2024-09-04 ENCOUNTER — DOCUMENTATION (OUTPATIENT)
Dept: TRANSPLANT | Facility: HOSPITAL | Age: 69
End: 2024-09-04
Payer: MEDICARE

## 2024-09-04 NOTE — PROGRESS NOTES
[employer] HMO active. No other coverage indicated. Eval auth on file with [facility] transplant.

## 2024-09-06 ENCOUNTER — TELEPHONE (OUTPATIENT)
Dept: TRANSPLANT | Facility: HOSPITAL | Age: 69
End: 2024-09-06
Payer: MEDICARE

## 2024-09-06 NOTE — TELEPHONE ENCOUNTER
Patient returns coordinator's call. He is very frustrated that he did not receive a call earlier in the week with an update on his possible candidacy for lung transplant with his history of cardiac surgery. Explained that the surgeons need a CT of chest/abdomen next week and to see him in clinic to determine if we can continue on with lung transplant evaluation. Instructed him to call back with any further questions or concerns.

## 2024-09-06 NOTE — TELEPHONE ENCOUNTER
Called patient and left voicemail to see if he has any questions from the education session this week. Also wanted to verify that he saw the scheduling for CT chest/abd/pelvis and appointment next Friday with Dr. Rivas and Dr. Perez. Instructed him to call back with any questions or concerns.

## 2024-09-13 ENCOUNTER — HOSPITAL ENCOUNTER (OUTPATIENT)
Facility: HOSPITAL | Age: 69
Setting detail: OUTPATIENT SURGERY
End: 2024-09-13
Attending: INTERNAL MEDICINE | Admitting: INTERNAL MEDICINE
Payer: MEDICARE

## 2024-09-13 ENCOUNTER — HOSPITAL ENCOUNTER (OUTPATIENT)
Dept: RADIOLOGY | Facility: HOSPITAL | Age: 69
Discharge: HOME | End: 2024-09-13
Payer: MEDICARE

## 2024-09-13 ENCOUNTER — OFFICE VISIT (OUTPATIENT)
Dept: CARDIAC SURGERY | Facility: HOSPITAL | Age: 69
End: 2024-09-13
Payer: MEDICARE

## 2024-09-13 VITALS
DIASTOLIC BLOOD PRESSURE: 82 MMHG | HEIGHT: 71 IN | SYSTOLIC BLOOD PRESSURE: 128 MMHG | HEART RATE: 107 BPM | WEIGHT: 188 LBS | BODY MASS INDEX: 26.32 KG/M2

## 2024-09-13 DIAGNOSIS — Z01.818 ENCOUNTER FOR PRE-TRANSPLANT EVALUATION FOR LUNG TRANSPLANT: Primary | ICD-10-CM

## 2024-09-13 DIAGNOSIS — E55.9 VITAMIN D DEFICIENCY: ICD-10-CM

## 2024-09-13 DIAGNOSIS — I25.10 CORONARY ARTERY DISEASE INVOLVING NATIVE CORONARY ARTERY OF NATIVE HEART, UNSPECIFIED WHETHER ANGINA PRESENT: ICD-10-CM

## 2024-09-13 DIAGNOSIS — Z12.5 SCREENING FOR PROSTATE CANCER: ICD-10-CM

## 2024-09-13 DIAGNOSIS — R94.6 THYROID FUNCTION STUDY ABNORMALITY: ICD-10-CM

## 2024-09-13 DIAGNOSIS — I73.9 CLAUDICATION (CMS-HCC): ICD-10-CM

## 2024-09-13 DIAGNOSIS — G89.29 OTHER CHRONIC PAIN: ICD-10-CM

## 2024-09-13 DIAGNOSIS — I25.10 CORONARY ARTERY DISEASE, UNSPECIFIED VESSEL OR LESION TYPE, UNSPECIFIED WHETHER ANGINA PRESENT, UNSPECIFIED WHETHER NATIVE OR TRANSPLANTED HEART: ICD-10-CM

## 2024-09-13 DIAGNOSIS — M81.0 OSTEOPOROSIS: ICD-10-CM

## 2024-09-13 DIAGNOSIS — D68.9 COAGULATION DEFECT (MULTI): ICD-10-CM

## 2024-09-13 DIAGNOSIS — Z01.818 ENCOUNTER FOR PRE-TRANSPLANT EVALUATION FOR LUNG TRANSPLANT: ICD-10-CM

## 2024-09-13 DIAGNOSIS — Z95.1 S/P CABG X 3: ICD-10-CM

## 2024-09-13 DIAGNOSIS — R09.89 SUSPECTED HEART DISEASE: ICD-10-CM

## 2024-09-13 DIAGNOSIS — Z01.818 PRE-TRANSPLANT EVALUATION FOR LUNG TRANSPLANT: Primary | ICD-10-CM

## 2024-09-13 DIAGNOSIS — R80.9 PROTEINURIA, UNSPECIFIED TYPE: ICD-10-CM

## 2024-09-13 DIAGNOSIS — Z86.39 H/O VITAMIN D DEFICIENCY: ICD-10-CM

## 2024-09-13 DIAGNOSIS — E61.1 IRON DEFICIENCY: ICD-10-CM

## 2024-09-13 PROCEDURE — 99214 OFFICE O/P EST MOD 30 MIN: CPT | Performed by: THORACIC SURGERY (CARDIOTHORACIC VASCULAR SURGERY)

## 2024-09-13 PROCEDURE — 1159F MED LIST DOCD IN RCRD: CPT | Performed by: THORACIC SURGERY (CARDIOTHORACIC VASCULAR SURGERY)

## 2024-09-13 PROCEDURE — 1126F AMNT PAIN NOTED NONE PRSNT: CPT | Performed by: THORACIC SURGERY (CARDIOTHORACIC VASCULAR SURGERY)

## 2024-09-13 PROCEDURE — 99204 OFFICE O/P NEW MOD 45 MIN: CPT | Performed by: THORACIC SURGERY (CARDIOTHORACIC VASCULAR SURGERY)

## 2024-09-13 PROCEDURE — 3008F BODY MASS INDEX DOCD: CPT | Performed by: THORACIC SURGERY (CARDIOTHORACIC VASCULAR SURGERY)

## 2024-09-13 PROCEDURE — 74176 CT ABD & PELVIS W/O CONTRAST: CPT

## 2024-09-13 RX ORDER — PREDNISONE 5 MG/1
5 TABLET ORAL DAILY
COMMUNITY
Start: 2024-09-12

## 2024-09-13 RX ORDER — BUDESONIDE, GLYCOPYRROLATE, AND FORMOTEROL FUMARATE 160; 9; 4.8 UG/1; UG/1; UG/1
2 AEROSOL, METERED RESPIRATORY (INHALATION) 2 TIMES DAILY
COMMUNITY

## 2024-09-13 ASSESSMENT — ENCOUNTER SYMPTOMS
LOSS OF SENSATION IN FEET: 1
DEPRESSION: 0
OCCASIONAL FEELINGS OF UNSTEADINESS: 0

## 2024-09-13 ASSESSMENT — PATIENT HEALTH QUESTIONNAIRE - PHQ9
2. FEELING DOWN, DEPRESSED OR HOPELESS: NOT AT ALL
SUM OF ALL RESPONSES TO PHQ9 QUESTIONS 1 AND 2: 0
1. LITTLE INTEREST OR PLEASURE IN DOING THINGS: NOT AT ALL

## 2024-09-13 ASSESSMENT — PAIN SCALES - GENERAL: PAINLEVEL: 0-NO PAIN

## 2024-09-16 ENCOUNTER — TELEPHONE (OUTPATIENT)
Dept: GASTROENTEROLOGY | Facility: HOSPITAL | Age: 69
End: 2024-09-16
Payer: MEDICARE

## 2024-09-16 ENCOUNTER — TELEPHONE (OUTPATIENT)
Dept: TRANSPLANT | Facility: HOSPITAL | Age: 69
End: 2024-09-16
Payer: MEDICARE

## 2024-09-16 DIAGNOSIS — Z94.2 LUNG TRANSPLANTED (MULTI): ICD-10-CM

## 2024-09-16 NOTE — TELEPHONE ENCOUNTER
Called patient and left voicemail in regards to next steps for the evaluation process. Instructed patient to call back for further help/instructions.

## 2024-09-17 ENCOUNTER — TELEPHONE (OUTPATIENT)
Dept: CARDIOLOGY | Facility: CLINIC | Age: 69
End: 2024-09-17
Payer: MEDICARE

## 2024-09-17 NOTE — TELEPHONE ENCOUNTER
Called Pt 3X and left messages to return to confirm his procedure date from 9-27-24 to 9-24-24 at [facility]  Due Dr. Landeros was unavailable on the 27th

## 2024-09-19 ENCOUNTER — TELEPHONE (OUTPATIENT)
Dept: TRANSPLANT | Facility: HOSPITAL | Age: 69
End: 2024-09-19
Payer: MEDICARE

## 2024-09-19 NOTE — TELEPHONE ENCOUNTER
Called patient and left voicemail in regards to next steps for evaluation process. Need to go over necessary vaccines and labs. Instructed patient to call back the office at his earliest convenience.

## 2024-09-20 ENCOUNTER — TELEPHONE (OUTPATIENT)
Dept: TRANSPLANT | Facility: HOSPITAL | Age: 69
End: 2024-09-20
Payer: MEDICARE

## 2024-09-20 ENCOUNTER — DOCUMENTATION (OUTPATIENT)
Dept: CARDIOLOGY | Facility: CLINIC | Age: 69
End: 2024-09-20
Payer: MEDICARE

## 2024-09-20 NOTE — PROGRESS NOTES
Pt returned phone call from office and left a voicemail that he does not want to move forward with any procedures at this time. He states he wants to see if he can get better on his own. He is deferring the lung transplant for now, therefore his heart cath has been cancelled. An email was sent to lung transplant team with this information.

## 2024-09-20 NOTE — TELEPHONE ENCOUNTER
"Returned patients call, patient stated that he is doing better and does not want to continue with a lung transplant evaluation. \"Since I am not getting worse and getting better, I have been doing a lot of things without oxygen, therapy is going well, they are other people out there that are desperate for a transplant and I do not feel I am part of that.\" Pulmonologist has adjusted medications and patient is feeling much better. Will discuss with team and close episode on 9/25 after selection.   "

## 2024-09-23 ENCOUNTER — APPOINTMENT (OUTPATIENT)
Dept: RADIOLOGY | Facility: CLINIC | Age: 69
End: 2024-09-23
Payer: MEDICARE

## 2024-09-24 NOTE — PROGRESS NOTES
Patient is here as part of pre-lung transplant evaluation. Briefly, he is a long time smoker underwent CABGx3/MVR/Maze/LAAL in 05/2023. 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 Subsequently, the patient had developed progressive SOB and increased 02 requirements, and after evaluation by pulmonary and obtaining PFTs was referred here for consideration of the lung Transplantation.   Patient is going through the work up. He is scheduled for left and right heart cath. I believe if the patient does not have pulmonary HTN, he may be a candidate for a single right lung transplantation.   If he has pulmonary HTN which would preclude him from a single lung Tx, we would not consider him for a bilateral lung Tx.

## 2024-09-25 ENCOUNTER — APPOINTMENT (OUTPATIENT)
Dept: RESPIRATORY THERAPY | Facility: HOSPITAL | Age: 69
End: 2024-09-25
Payer: MEDICARE

## 2024-09-25 ENCOUNTER — APPOINTMENT (OUTPATIENT)
Dept: RADIOLOGY | Facility: HOSPITAL | Age: 69
End: 2024-09-25
Payer: MEDICARE

## 2024-09-25 ENCOUNTER — COMMITTEE REVIEW (OUTPATIENT)
Dept: TRANSPLANT | Facility: HOSPITAL | Age: 69
End: 2024-09-25
Payer: MEDICARE

## 2024-09-25 ENCOUNTER — TELEPHONE (OUTPATIENT)
Dept: TRANSPLANT | Facility: HOSPITAL | Age: 69
End: 2024-09-25
Payer: MEDICARE

## 2024-09-25 NOTE — COMMITTEE REVIEW
Patient Discussion Note      Organ being evaluated for: Lung    Transplant Coordinator: Diamante Vázquez   Transplant Surgeon:   Dr. Caitlyn Calhoun, Dr. Erik Rivas.    Referring Physician:  Dr. Mathews     Committee Review Members:  Latrell Quintanilla RDN, LD   Ethicist Placido Escobar, PhD   Finance Alfie Su   Pharmacy Lesly Mccracken, DimitriD   Psychology Marjan Adams, PhD    LILY PHYLLIS [facility]   Transplant Diamante Vázquez, SACHIN, Gayatri Reed, APRN-CNP, Caitlyn Calhoun MD, TOMASA BAZAN, Sherry Ya MD, Yohannes Chavez DO, Amy Jarvis, APRN-CNS, Sherry Perez MD, Sandra Degroot RN   Transplant Surgery Erik Rivas MD PhD       Additional Discussion Notes and Findings: Discussed patient's case during Selection Meeting. Patient called the transplant office and voiced his decision to stop the evaluation process for lung transplantation. He reports that his pulmonologist recently optimized some of his home medications, which has helped him to feel better completing his daily activities. He does not feel as though he needs a lung transplant at this time. Letters sent to both the patient and referring physician indicating that his transplant evaluation will be closed due to patient preference.

## 2024-09-25 NOTE — TELEPHONE ENCOUNTER
Called patient to let him know that his transplant episode is officially closed and that his upcoming appointments have been cancelled. Patient verbalizes understanding. Instructed him to call the office back if he has any questions or change in condition. Letter to Close sent to patient and Dr. Mathews (referring MD).

## 2024-09-25 NOTE — LETTER
September 25, 2024    Jorge Alberto Romeroncer  [address] [address]      Dear Mr. Martin:    Our multi-disciplinary transplant team completed a review of your medical records on 9/25/2024.  I regret to inform you that the decision was not to proceed with placing you on the [facility] ([facility]) waiting list for a Lung transplant.    Our transplant program consists of surgeons and medical doctors who provide coverage 365 days a year, 24 hours a day.     If you have any questions or concerns regarding your insurance coverage or billing issues, a  is available to speak with you.     It is important to keep us updated of any major changes in your medical condition, contact information and health insurance coverage.     Please don't hesitate to contact us at Dept: [phone] with any questions or concerns. We look forward to working with you through this process.      Sincerely,      Diamante Vázquez RN          The [facility] Toll-free Patient Services Line:  Your Resource for Organ Transplant Information    If you have a question regarding your own medical care, you always should call your transplant hospital first. However, for general organ transplant-related information, you should call the [facility] ([facility]) toll-free patient services line at [phone].  Anyone, including potential transplant candidates, candidates, recipients, family members, friends, living donors, and donor family members, can call this number to:    Talk about organ donation, living donation, the transplant process, the donation process, and transplant policies.  Get a free patient information kit with helpful booklets, waiting list and transplant information, and a list of all transplant hospitals.  Ask questions about the Organ Procurement and Transplantation Network (OPTN) web site (http://optn.transplant.hrsa.gov/), the [facility] Web site (http://unos.org/), or the [facility]  web site for living donors and transplant recipients. (http://www.transplantliving.org/).  Learn how [facility] and the OPTN can help you.  Talk about any concerns that you may have with a transplant hospital.    [employer] is a not-for-profit organization that provides the administrative services for the national OPTN under federal contract to the Health Resources and Services Administration (HRSA), an agency under the U.[place]. Department of Health and Human Services (HHS).    [facility] and the OPTN are responsible for:    Providing educational material for patients, the public, and professionals.  Raising awareness of the need for donated organs and tissue.  Writing organ transplant policy with help from transplant professionals, transplant patients, transplant candidates, donor families, living donors, and the public.  Coordinating organ procurement, matching, and placement.  Collecting information about every organ transplant and donation that occurs in the [place].    Remember, you should contact your transplant hospital directly if you have questions or concerns about your own medical care including medical records, work-up progress, and test results.    [facility] is not your transplant hospital, and staff at [facility] will not be able to transfer you to your transplant hospital, so keep your transplant hospital’s phone number handy.    However, while you research your transplant needs and learn as much as you can about transplantation and donation, we welcome your call to our toll-free patient services line at [phone].      [facility] PIL Final Rev 1-

## 2024-09-26 ENCOUNTER — APPOINTMENT (OUTPATIENT)
Dept: CARDIOLOGY | Facility: HOSPITAL | Age: 69
End: 2024-09-26
Payer: MEDICARE

## 2024-09-26 ENCOUNTER — APPOINTMENT (OUTPATIENT)
Dept: TRANSPLANT | Facility: HOSPITAL | Age: 69
End: 2024-09-26
Payer: MEDICARE

## 2024-09-27 ENCOUNTER — APPOINTMENT (OUTPATIENT)
Dept: RADIOLOGY | Facility: HOSPITAL | Age: 69
End: 2024-09-27
Payer: MEDICARE

## 2024-09-30 ENCOUNTER — APPOINTMENT (OUTPATIENT)
Dept: VASCULAR MEDICINE | Facility: HOSPITAL | Age: 69
End: 2024-09-30
Payer: MEDICARE

## 2024-09-30 ENCOUNTER — APPOINTMENT (OUTPATIENT)
Dept: TRANSPLANT | Facility: HOSPITAL | Age: 69
End: 2024-09-30
Payer: MEDICARE

## 2024-09-30 ENCOUNTER — APPOINTMENT (OUTPATIENT)
Dept: RADIOLOGY | Facility: HOSPITAL | Age: 69
End: 2024-09-30
Payer: MEDICARE

## 2024-10-04 ENCOUNTER — APPOINTMENT (OUTPATIENT)
Dept: TRANSPLANT | Facility: HOSPITAL | Age: 69
End: 2024-10-04
Payer: MEDICARE

## 2024-10-04 ENCOUNTER — APPOINTMENT (OUTPATIENT)
Dept: CARDIAC SURGERY | Facility: HOSPITAL | Age: 69
End: 2024-10-04
Payer: MEDICARE

## 2024-10-14 ENCOUNTER — APPOINTMENT (OUTPATIENT)
Dept: RADIOLOGY | Facility: HOSPITAL | Age: 69
End: 2024-10-14
Payer: MEDICARE